# Patient Record
Sex: FEMALE | Race: BLACK OR AFRICAN AMERICAN | NOT HISPANIC OR LATINO | URBAN - METROPOLITAN AREA
[De-identification: names, ages, dates, MRNs, and addresses within clinical notes are randomized per-mention and may not be internally consistent; named-entity substitution may affect disease eponyms.]

---

## 2022-09-03 ENCOUNTER — EMERGENCY (EMERGENCY)
Facility: HOSPITAL | Age: 67
LOS: 0 days | Discharge: HOME | End: 2022-09-03
Attending: STUDENT IN AN ORGANIZED HEALTH CARE EDUCATION/TRAINING PROGRAM

## 2022-09-03 VITALS
SYSTOLIC BLOOD PRESSURE: 123 MMHG | WEIGHT: 156.09 LBS | TEMPERATURE: 98 F | OXYGEN SATURATION: 98 % | RESPIRATION RATE: 18 BRPM | DIASTOLIC BLOOD PRESSURE: 82 MMHG | HEIGHT: 64 IN | HEART RATE: 77 BPM
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
## 2022-09-03 DIAGNOSIS — M25.511 PAIN IN RIGHT SHOULDER: ICD-10-CM

## 2022-09-03 DIAGNOSIS — I10 ESSENTIAL (PRIMARY) HYPERTENSION: ICD-10-CM

## 2022-09-03 DIAGNOSIS — R20.0 ANESTHESIA OF SKIN: ICD-10-CM

## 2022-09-03 DIAGNOSIS — E03.9 HYPOTHYROIDISM, UNSPECIFIED: ICD-10-CM
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
## 2022-09-03 PROCEDURE — 73030 X-RAY EXAM OF SHOULDER: CPT | Mod: 26,RT

## 2022-09-03 PROCEDURE — 99053 MED SERV 10PM-8AM 24 HR FAC: CPT

## 2022-09-03 PROCEDURE — 73060 X-RAY EXAM OF HUMERUS: CPT | Mod: 26,RT

## 2022-09-03 PROCEDURE — 99284 EMERGENCY DEPT VISIT MOD MDM: CPT

## 2022-09-03 RX ORDER — LEVOTHYROXINE SODIUM 125 MCG
1 TABLET ORAL
Qty: 0 | Refills: 0 | DISCHARGE

## 2022-09-03 RX ORDER — AMLODIPINE BESYLATE 2.5 MG/1
1 TABLET ORAL
Qty: 0 | Refills: 0 | DISCHARGE

## 2022-09-03 RX ORDER — IBUPROFEN 200 MG
400 TABLET ORAL ONCE
Refills: 0 | Status: COMPLETED | OUTPATIENT
Start: 2022-09-03 | End: 2022-09-03

## 2022-09-03 RX ADMIN — Medication 400 MILLIGRAM(S): at 07:55

## 2022-09-03 NOTE — ED PROVIDER NOTE - CLINICAL SUMMARY MEDICAL DECISION MAKING FREE TEXT BOX
67 yo female with pmhx htn hypothyroidism, presenting with right shoulder pain/numbness for 2 days. suggestive post reduction shoulder stiffness and pain. but will eval for fx vs dislocation with xrays, will give motrin and will reassess.

## 2022-09-03 NOTE — ED PROVIDER NOTE - OBJECTIVE STATEMENT
67 yo female with pmhx htn hypothyroidism, presenting with right shoulder pain/numbness for 2 days. patient states she had slip and fall 3 mo ago in [place], where she went to hospital there and was found to have shoulder dislocation, which was then reduced. since then her ROM has been limited due to stiffness and pain. over past 2 days pt began noticing right shoulder numbness and swelling, came to ED for further eval.    denies recent recurrent injury, weakness, falls.

## 2022-09-03 NOTE — ED PROVIDER NOTE - PROGRESS NOTE DETAILS
PEARL Perry MD  d/w ortho, does not appear to be dislocation, possible rotator cuff issues. do not rec sling, rec outpatient ortho f/u for mri.

## 2022-09-03 NOTE — ED ADULT TRIAGE NOTE - CHIEF COMPLAINT QUOTE
complaining of Right shoulder pain for 2 days, unable to lift right arm. noticeable in and out from the socket. denies trauma. h/o R shoulder dislocation 2 months.

## 2022-09-03 NOTE — ED PROVIDER NOTE - NSFOLLOWUPINSTRUCTIONS_ED_ALL_ED_FT
Activities as tolerated. Please encourage good oral and fluid intake. For pain, please take Motrin 400mg every 4 hours as needed, or Tylenol 650mg every 6 hours as needed.    Weight bearing as tolerated, can use ice for comfort.    Please see your primary care doctor within 24-48 hours for further management of your symptoms.  Please follow up with orthopedist in one week for further evaluation of your symptoms as well as for possible MRI.    Please seek emergent medical management if you have any worsening signs or symptoms.

## 2022-09-03 NOTE — ED PROVIDER NOTE - PATIENT PORTAL LINK FT
You can access the FollowMyHealth Patient Portal offered by [facility] [place] by registering at the following website: [email or web address]. By joining [employer]’s FollowMyHealth portal, you will also be able to view your health information using other applications (apps) compatible with our system.

## 2023-08-30 LAB
ESTIMATED AVERAGE GLUCOSE: 123
HBA1C MFR BLD: 5.9 %

## 2023-10-18 ENCOUNTER — OFFICE VISIT (OUTPATIENT)
Facility: CLINIC | Age: 68
End: 2023-10-18
Payer: MEDICARE

## 2023-10-18 VITALS
DIASTOLIC BLOOD PRESSURE: 78 MMHG | HEART RATE: 60 BPM | OXYGEN SATURATION: 95 % | TEMPERATURE: 98.2 F | HEIGHT: 64 IN | WEIGHT: 156 LBS | BODY MASS INDEX: 26.63 KG/M2 | RESPIRATION RATE: 17 BRPM | SYSTOLIC BLOOD PRESSURE: 135 MMHG

## 2023-10-18 DIAGNOSIS — Z76.89 ENCOUNTER TO ESTABLISH CARE: Primary | ICD-10-CM

## 2023-10-18 DIAGNOSIS — L28.1 PRURIGO NODULARIS: ICD-10-CM

## 2023-10-18 DIAGNOSIS — I87.2 VENOUS INSUFFICIENCY: ICD-10-CM

## 2023-10-18 DIAGNOSIS — Z12.4 CERVICAL CANCER SCREENING: ICD-10-CM

## 2023-10-18 DIAGNOSIS — Z71.3 WEIGHT LOSS COUNSELING, ENCOUNTER FOR: ICD-10-CM

## 2023-10-18 DIAGNOSIS — Z11.4 SCREENING FOR HIV WITHOUT PRESENCE OF RISK FACTORS: ICD-10-CM

## 2023-10-18 DIAGNOSIS — Z11.59 NEED FOR HEPATITIS C SCREENING TEST: ICD-10-CM

## 2023-10-18 DIAGNOSIS — Z78.0 POST-MENOPAUSAL: ICD-10-CM

## 2023-10-18 DIAGNOSIS — I10 PRIMARY HYPERTENSION: ICD-10-CM

## 2023-10-18 DIAGNOSIS — I87.2 VENOUS STASIS DERMATITIS OF LEFT LOWER EXTREMITY: ICD-10-CM

## 2023-10-18 DIAGNOSIS — Z86.718 HX OF DEEP VENOUS THROMBOSIS: ICD-10-CM

## 2023-10-18 DIAGNOSIS — E03.9 ACQUIRED HYPOTHYROIDISM: ICD-10-CM

## 2023-10-18 DIAGNOSIS — Z12.11 COLON CANCER SCREENING: ICD-10-CM

## 2023-10-18 DIAGNOSIS — Z12.31 ENCOUNTER FOR SCREENING MAMMOGRAM FOR MALIGNANT NEOPLASM OF BREAST: ICD-10-CM

## 2023-10-18 DIAGNOSIS — R73.03 PREDIABETES: ICD-10-CM

## 2023-10-18 DIAGNOSIS — E78.5 HYPERLIPIDEMIA, UNSPECIFIED HYPERLIPIDEMIA TYPE: ICD-10-CM

## 2023-10-18 PROCEDURE — 3075F SYST BP GE 130 - 139MM HG: CPT | Performed by: STUDENT IN AN ORGANIZED HEALTH CARE EDUCATION/TRAINING PROGRAM

## 2023-10-18 PROCEDURE — 99204 OFFICE O/P NEW MOD 45 MIN: CPT | Performed by: STUDENT IN AN ORGANIZED HEALTH CARE EDUCATION/TRAINING PROGRAM

## 2023-10-18 PROCEDURE — 1123F ACP DISCUSS/DSCN MKR DOCD: CPT | Performed by: STUDENT IN AN ORGANIZED HEALTH CARE EDUCATION/TRAINING PROGRAM

## 2023-10-18 PROCEDURE — 3078F DIAST BP <80 MM HG: CPT | Performed by: STUDENT IN AN ORGANIZED HEALTH CARE EDUCATION/TRAINING PROGRAM

## 2023-10-18 RX ORDER — METFORMIN HYDROCHLORIDE 500 MG/1
500 TABLET, EXTENDED RELEASE ORAL
Qty: 30 TABLET | Refills: 0 | Status: SHIPPED | OUTPATIENT
Start: 2023-10-18

## 2023-10-18 SDOH — ECONOMIC STABILITY: INCOME INSECURITY: HOW HARD IS IT FOR YOU TO PAY FOR THE VERY BASICS LIKE FOOD, HOUSING, MEDICAL CARE, AND HEATING?: NOT HARD AT ALL

## 2023-10-18 SDOH — ECONOMIC STABILITY: FOOD INSECURITY: WITHIN THE PAST 12 MONTHS, THE FOOD YOU BOUGHT JUST DIDN'T LAST AND YOU DIDN'T HAVE MONEY TO GET MORE.: NEVER TRUE

## 2023-10-18 SDOH — ECONOMIC STABILITY: FOOD INSECURITY: WITHIN THE PAST 12 MONTHS, YOU WORRIED THAT YOUR FOOD WOULD RUN OUT BEFORE YOU GOT MONEY TO BUY MORE.: NEVER TRUE

## 2023-10-18 SDOH — ECONOMIC STABILITY: HOUSING INSECURITY
IN THE LAST 12 MONTHS, WAS THERE A TIME WHEN YOU DID NOT HAVE A STEADY PLACE TO SLEEP OR SLEPT IN A SHELTER (INCLUDING NOW)?: NO

## 2023-10-18 ASSESSMENT — PATIENT HEALTH QUESTIONNAIRE - PHQ9
SUM OF ALL RESPONSES TO PHQ QUESTIONS 1-9: 2
1. LITTLE INTEREST OR PLEASURE IN DOING THINGS: 1
SUM OF ALL RESPONSES TO PHQ9 QUESTIONS 1 & 2: 2
2. FEELING DOWN, DEPRESSED OR HOPELESS: 1
SUM OF ALL RESPONSES TO PHQ QUESTIONS 1-9: 2

## 2023-10-18 ASSESSMENT — ENCOUNTER SYMPTOMS
SHORTNESS OF BREATH: 0
COLOR CHANGE: 1
ABDOMINAL PAIN: 0

## 2023-10-18 NOTE — PROGRESS NOTES
Subjective:      Patient ID: Huseyin Rodriguez is a 74 y.o. female.     Pap  Mammo  DEXA  Flu        Review of Systems    Objective:   Physical Exam    Assessment:      ***      Plan:      ***        Gamal Leyva

## 2023-10-18 NOTE — PROGRESS NOTES
Estrada Wheeler is a 74 y.o. presents today for   Chief Complaint   Patient presents with    [address]       Is someone accompanying this pt? NO    Is the patient using any DME equipment during OV? NO    Depression Screening:       10/18/2023     9:55 AM   PHQ-9 Questionaire   Little interest or pleasure in doing things 1   Feeling down, depressed, or hopeless 1   PHQ-9 Total Score 2       Abuse Screening:       No data to display                Learning Assessment:  No question data found. Fall Risk:       No data to display                    Coordination of Care:   1. \"Have you been to the ER, urgent care clinic since your last visit? Hospitalized since your last visit? \" NO    2. \"Have you seen or consulted any other health care providers outside of the [address] [place] [address] since your last visit? \" NO    3. For patients aged 43-73: Has the patient had a colonoscopy / FIT/ Cologuard? YES    If the patient is female:    4. For patients aged 43-66: Has the patient had a mammogram within the past 2 years? NO    5. For patients aged 21-65: Has the patient had a pap smear? NO    Health Maintenance: reviewed and discussed and ordered per Provider.     Health Maintenance Due   Topic Date Due    COVID-19 Vaccine (1) Never done    Lipids  Never done    Depression Screen  Never done    HIV screen  Never done    Hepatitis C screen  Never done    DTaP/Tdap/Td vaccine (1 - Tdap) Never done    Cervical cancer screen  Never done    Colorectal Cancer Screen  Never done    Shingles vaccine (1 of 2) Never done    DEXA (modify frequency per FRAX score)  Never done    Breast cancer screen  03/17/2019    Pneumococcal 65+ years Vaccine (1 - PCV) Never done    Flu vaccine (1) Never done

## 2023-10-18 NOTE — PROGRESS NOTES
History of Present Illness  Zion Nunes is a 74 y.o. female who presents today for management of    Chief Complaint   Patient presents with    Establish Care     PATIENT HERE TO ESTABLISH CARE WITH PROVIDER     CC: new patient here for healthcare maintenance    -Patient is here to establish care.    -Previous PCP: Honorio Arellano MD, last visit 8/2023    -Works as a caretaker  -She lives at home with son   -Pt reports good support system with family/friends and feels safe at home.    -pt reporting weight gain and difficulty loosing weight, has tried diet and exercise  -also very bothered by discoloration of skin over her left ankle, sees dermatology who prescribed steroid cream to help with hyperpigmentation, pt states this is helping a little, but she still cannot wear the shoes she likes because she does not like how the skin looks  -also seen by vascular who did multiple PVLs due to hx of venous insufficiency, which were normal.   -pt wears compression stockings when she is working  -pt reports compliance to medications, however states that she takes them differently than prescribed (clonidine at night as needed for sleep and anxiety, and 1/2 of losartan/HCTZ tablet daily)    Healthcare maintenance:  Immunizations:  Flu vacc: overdue ,declines   TDaP vacc: UTD  Pneumonia vacc: overdue  Shingles: overdue  COVID vacc: overdue  Last Pap: overdue  Last Mammo: overdue  Last Colonoscopy: last year per pt recollection, pt cannot remember name of doctor or facility  Sexual health: Pt declines STI screening  Mood: Reports good mood overall      10/18/2023     9:55 AM   PHQ-9    Little interest or pleasure in doing things 1   Feeling down, depressed, or hopeless 1   PHQ-2 Score 2   PHQ-9 Total Score 2     Past Medical History  Past Medical History:   Diagnosis Date    Acquired hypothyroidism     Arthritis     Asthma     Essential hypertension     Hypertension     Kidney stone     Piercing     Rectal bleeding

## 2023-11-14 DIAGNOSIS — R73.03 PREDIABETES: ICD-10-CM

## 2023-11-14 RX ORDER — METFORMIN HYDROCHLORIDE 500 MG/1
500 TABLET, EXTENDED RELEASE ORAL
Qty: 90 TABLET | Refills: 1 | Status: SHIPPED | OUTPATIENT
Start: 2023-11-14

## 2024-05-04 NOTE — CONSULT NOTE ADULT - SUBJECTIVE AND OBJECTIVE BOX
************ Incomplete *************    jana is a 66yF RHD who presents to with R shoulder pain. She dislocated her R shoulder 3 months ago in June. She went to the emergency room at the time, where they relocated the shoulder back into place. She wore the sling for 1- 2 weeks at the time and followed up with outpatient orthopedic surgeon. She had an MRI done that she says showed a complete rotator cuff tear. Surgery was offered to her at the time, but she was hesitant to proceed. Since then, her shoulder has been swollen and occasionally painful and sometimes a little more numb around the shoulder/upper arm region compared to the left side. She cannot lift her shoulder up on her own. She otherwise denies of any numbness or tingling down the hands. She denies any other pain anywhere else and has not had any other falls or dislocations since  No other orthopedic concerns at this time.    PMHx    HTN (hypertension)    Hypothyroid    No Known Allergies      Vital Signs (24 Hrs):  T(C): 36.8 (09-03-22 @ 05:59), Max: 36.8 (09-03-22 @ 05:59)  HR: 77 (09-03-22 @ 05:59) (77 - 77)  BP: 123/82 (09-03-22 @ 05:59) (123/82 - 123/82)  RR: 18 (09-03-22 @ 05:59) (18 - 18)  SpO2: 98% (09-03-22 @ 05:59) (98% - 98%)    Exam:  Gen: NAD, Resting comfortably  RUE:   Skin intact, no erythema or ecchymosis  Humeral head appears to be superiorly translated compared to contralateral side   Mild TTP along lateral aspect of shoulder by acromion  No other TTP along RUE  Restricted AROM and PROM of shoulder abduction  Motor: + Axillary/Musc/Med/Rad/Uln  Sensory: 1/2 Axillary; + AIN/PIN/Uln  2+ Radial pulse    Imaging:  Xray Shoulder: No obvious fractures or dislocations of glenohumeral joint    A/P: 66F RHD w/ pain prior R shoulder dislocation in June 2022 that was relocated at the time. and found to have a complete rotator cuff tear that has not been repaired presenting w/ R shoulder    -Pain control as needed  -WBAT of RUE  -No sling required at this time since last dislocation occurred 3 months ago; further immobilization of joint will not benefit the pt and would increase stiffness  -Prior MRI read of complete   DVT ppx  PT/OT  Ice and elevate as tolerated  No acute orthopedic surgical intervention indicated at this time  Orthopedically stable  Will discuss/Discussed with attending who is in agreement with above plan   Patient is a 66F RHD who presents to with Right shoulder pain. She dislocated her Right shoulder 3 months ago in June after falling on a treatmill. She went to the emergency room at the time, where they reduced the shoulder. She wore the sling for 1- 2 weeks at the time and followed up with outpatient orthopedic surgeon at home in [place]. Per patient, she had an MRI done that she says showed a "complete rotator cuff tear." Surgery was offered to her at the time, but she was hesitant to proceed. Since then, her shoulder has been swollen and occasionally painful and sometimes "a little more numb" around the shoulder/upper arm region compared to the Left side. She cannot lift her shoulder up on her own. She otherwise denies of any numbness or tingling down the hands. She denies any other pain anywhere else and has not had any other falls or dislocations since  No other orthopedic concerns at this time.    PMHx    HTN (hypertension)    Hypothyroid    No Known Allergies      Vital Signs (24 Hrs):  T(C): 36.8 (09-03-22 @ 05:59), Max: 36.8 (09-03-22 @ 05:59)  HR: 77 (09-03-22 @ 05:59) (77 - 77)  BP: 123/82 (09-03-22 @ 05:59) (123/82 - 123/82)  RR: 18 (09-03-22 @ 05:59) (18 - 18)  SpO2: 98% (09-03-22 @ 05:59) (98% - 98%)    Exam:  Gen: NAD, Resting comfortably  RUE:   Skin intact, no erythema or ecchymosis.   Humeral head appears to be superiorly translated compared to contralateral side.    Mild TTP along lateral aspect of shoulder by acromion; No other TTP along RUE.   Restricted AROM and PROM of shoulder abduction; Unable to actively forward flex or ABduct the shoulder past 15 and 30 degrees, respectively.   Positive Drop Arm test; Negative Belly Press and Lift Off Tests; Negative Neer's Impingement test.   Motor: + Axillary/Musc/Med/Rad/Uln/AIN/PIN.   Sensory: 1/2 Axillary; Otherwise fully sensate throughout the extremity and digits.   2+ Radial pulse.     Imaging:  XR Right Shoulder: No obvious fractures or dislocations of glenohumeral joint.     A/P: 66F RHD w/ pain prior R shoulder dislocation in June 2022 status-post ED reduction; Per patient endorsement, found to have a "complete rotator cuff tear" that has not been repaired, now presenting with Right shoulder pain with partial deltoid numbness.     - Pain control as needed.   - WBAT RUE.   - No sling required at this time since last dislocation occurred 3 months ago; further immobilization of joint will not benefit the pt and would increase stiffness.   - PT/OT.   - No acute orthopedic surgical intervention indicated at this time.   - Orthopedically stable for discharge.   - Recommend follow-up with Dr. Yandel Freitas or primary Orthopaedic surgeon in [place] (Dr. Delacruz) for surgical evaluation and scheduling of rotator cuff pathology.   - Discussed with Dr. Fontana who is aware of and in agreement with the above plan.    no AD

## 2024-11-19 ENCOUNTER — COMMUNITY OUTREACH (OUTPATIENT)
Facility: CLINIC | Age: 69
End: 2024-11-19

## 2024-11-19 NOTE — PROGRESS NOTES
Patient's HM shows they are overdue for A1c and Colorectal Screening.   Care Everywhere and  files searched.   updated with 2023 A1C.